# Patient Record
Sex: FEMALE | Race: OTHER | HISPANIC OR LATINO | URBAN - METROPOLITAN AREA
[De-identification: names, ages, dates, MRNs, and addresses within clinical notes are randomized per-mention and may not be internally consistent; named-entity substitution may affect disease eponyms.]

---

## 2023-07-18 ENCOUNTER — EMERGENCY (EMERGENCY)
Facility: HOSPITAL | Age: 43
LOS: 0 days | Discharge: HOME | End: 2023-07-18
Attending: STUDENT IN AN ORGANIZED HEALTH CARE EDUCATION/TRAINING PROGRAM
Payer: MEDICAID

## 2023-07-18 VITALS — HEIGHT: 66.93 IN | WEIGHT: 192.02 LBS

## 2023-07-18 VITALS
RESPIRATION RATE: 19 BRPM | OXYGEN SATURATION: 97 % | SYSTOLIC BLOOD PRESSURE: 132 MMHG | DIASTOLIC BLOOD PRESSURE: 61 MMHG | HEART RATE: 78 BPM | TEMPERATURE: 98 F

## 2023-07-18 DIAGNOSIS — R06.02 SHORTNESS OF BREATH: ICD-10-CM

## 2023-07-18 DIAGNOSIS — R10.9 UNSPECIFIED ABDOMINAL PAIN: ICD-10-CM

## 2023-07-18 DIAGNOSIS — R07.9 CHEST PAIN, UNSPECIFIED: ICD-10-CM

## 2023-07-18 DIAGNOSIS — H10.9 UNSPECIFIED CONJUNCTIVITIS: ICD-10-CM

## 2023-07-18 DIAGNOSIS — B34.9 VIRAL INFECTION, UNSPECIFIED: ICD-10-CM

## 2023-07-18 PROCEDURE — 93005 ELECTROCARDIOGRAM TRACING: CPT

## 2023-07-18 PROCEDURE — 99284 EMERGENCY DEPT VISIT MOD MDM: CPT

## 2023-07-18 PROCEDURE — 93010 ELECTROCARDIOGRAM REPORT: CPT

## 2023-07-18 PROCEDURE — 99283 EMERGENCY DEPT VISIT LOW MDM: CPT | Mod: 25

## 2023-07-18 RX ORDER — POLYMYXIN B SULF/TRIMETHOPRIM 10000-1/ML
1 DROPS OPHTHALMIC (EYE)
Qty: 1 | Refills: 0
Start: 2023-07-18 | End: 2023-07-22

## 2023-07-18 NOTE — ED STATDOCS - ATTENDING APP SHARED VISIT CONTRIBUTION OF CARE
I, Brain Jacobo, DO personally saw the patient with NARCISA.  I have personally performed a face to face diagnostic evaluation on this patient.  I have reviewed the NARCISA note and agree with the history, exam, and plan of care, except as noted.  I personally saw the patient and performed a substantive portion of the visit including all aspects of the medical decision making.

## 2023-07-18 NOTE — ED STATDOCS - PATIENT PORTAL LINK FT
You can access the FollowMyHealth Patient Portal offered by [facility] by registering at the following website: [email or web address]. By joining [employer]’s FollowMyHealth portal, you will also be able to view your health information using other applications (apps) compatible with our system.

## 2023-07-18 NOTE — ED STATDOCS - PROGRESS NOTE DETAILS
pt aware to use Polytrim as directed and fu with pmd, pt well appearing on discharge and agrees with plan. -Libby Pollack PA-C

## 2023-07-18 NOTE — ED ADULT TRIAGE NOTE - CHIEF COMPLAINT QUOTE
pt presents to Ed with complaints of chest pain, epigastric abdominal pain, subjective fever, and nausea x 3 days. EKG in progress.

## 2023-07-18 NOTE — ED STATDOCS - OBJECTIVE STATEMENT
: [id number]  43 y/o female w/ no pertinent PMHx presents to the ED c/o chest pain, abd pain, SOB, fevers, facial congestion, nausea x3 days, no vomiting, no diarrhea. Pt also endorses sore throat and right eye redness and right eye discharge. Denies sick contacts at home. States she has not seen her PCP for her symptoms. Pt's pharmacy is [facility] on [address]

## 2023-07-18 NOTE — ED ADULT NURSE REASSESSMENT NOTE - NS ED NURSE REASSESS COMMENT FT1
Patient seen evaluated treated and discharged by Dr. Jacobo and MARLON Martinez. No nursing interventions occurred. Pt left ED with all belongings.

## 2023-07-18 NOTE — ED STATDOCS - CLINICAL SUMMARY MEDICAL DECISION MAKING FREE TEXT BOX
41 y/o female w/ a viral syndrome and conjunctivitis of the right eye. Will provide eye drops to cover for bacterial conjunctivitis and d/c

## 2023-07-18 NOTE — ED ADULT TRIAGE NOTE - WEIGHT METHOD
This patient was referred for audiometric/tympanometric testing by JINA James due to sudden hearing loss, right ear. Pure tone air conduction audiometry revealed a borderline normal-to-mild   hearing loss in the left ear, and a moderate-to-severe rising to mild hearing loss in the right ear. Reliability was good. Results are consistent with previous test results. Tympanometry revealed normal middle ear peak pressure and compliance, in the left ear, and a wide gradient in the right ear with -128 daPa peak pressure. The results were reviewed with the patient. Hearing aids were discussed. Patient will call her insurance then call back if she wishes to schedule a hearing aid trial through [address] Gave patient a card with our contact information. Recommendations for follow up will be made pending physician consult.     Alex Perez AuD stated

## 2023-08-01 ENCOUNTER — EMERGENCY (EMERGENCY)
Facility: HOSPITAL | Age: 43
LOS: 0 days | Discharge: HOME | End: 2023-08-01
Attending: EMERGENCY MEDICINE
Payer: MEDICAID

## 2023-08-01 VITALS — HEIGHT: 66.93 IN | WEIGHT: 195.11 LBS

## 2023-08-01 VITALS
OXYGEN SATURATION: 99 % | TEMPERATURE: 98 F | DIASTOLIC BLOOD PRESSURE: 79 MMHG | RESPIRATION RATE: 20 BRPM | HEART RATE: 79 BPM | SYSTOLIC BLOOD PRESSURE: 124 MMHG

## 2023-08-01 DIAGNOSIS — M79.602 PAIN IN LEFT ARM: ICD-10-CM

## 2023-08-01 DIAGNOSIS — R23.8 OTHER SKIN CHANGES: ICD-10-CM

## 2023-08-01 DIAGNOSIS — L73.2 HIDRADENITIS SUPPURATIVA: ICD-10-CM

## 2023-08-01 DIAGNOSIS — L03.112 CELLULITIS OF LEFT AXILLA: ICD-10-CM

## 2023-08-01 PROCEDURE — 99283 EMERGENCY DEPT VISIT LOW MDM: CPT

## 2023-08-01 RX ORDER — CEPHALEXIN 500 MG
500 CAPSULE ORAL ONCE
Refills: 0 | Status: COMPLETED | OUTPATIENT
Start: 2023-08-01 | End: 2023-08-01

## 2023-08-01 RX ORDER — CEPHALEXIN 500 MG
1 CAPSULE ORAL
Qty: 40 | Refills: 0
Start: 2023-08-01 | End: 2023-08-10

## 2023-08-01 RX ORDER — ACETAMINOPHEN 500 MG
650 TABLET ORAL ONCE
Refills: 0 | Status: COMPLETED | OUTPATIENT
Start: 2023-08-01 | End: 2023-08-01

## 2023-08-01 RX ADMIN — Medication 650 MILLIGRAM(S): at 20:48

## 2023-08-01 RX ADMIN — Medication 1 TABLET(S): at 20:48

## 2023-08-01 RX ADMIN — Medication 500 MILLIGRAM(S): at 20:48

## 2023-08-01 NOTE — ED ADULT TRIAGE NOTE - CHIEF COMPLAINT QUOTE
Pt came into the ED with c/o see chief complaint quote. Pt reports about 2 weeks ago she "developed small lumps on her left breast and then the lumps became bigger and the breast is more red/swollen appearing". Pt also reports she had a fever (unknown temp) about 2 weeks ago when the lumps first appeared. Pt denies any PMH or allergies. ID #[id number].

## 2023-08-01 NOTE — ED STATDOCS - CLINICAL SUMMARY MEDICAL DECISION MAKING FREE TEXT BOX
No drainable collection.  D/c home with Bactrim, Keflex.  Also recommend warm compresses.  F/u with dermatology.  Strict return precautions given.

## 2023-08-01 NOTE — ED STATDOCS - NSFOLLOWUPINSTRUCTIONS_ED_ALL_ED_FT
Hidradenitis supurativa  Hidradenitis Suppurativa  Cross-sectional view of the skin showing a normal pore, a blocked pore, and an infected pore.   La hidradenitis supurativa es tasha enfermedad a gregg plazo (crónica) de la piel. Es similar a tasha forma grave de acné, wilder puede afectar zonas del cuerpo donde el acné sería poco frecuente, especialmente en zonas del cuerpo donde hay roce de piel y humedad. Estos incluyen:  Las axilas.  La mackenzie.  La karthik genital.  Las nalgas.  La parte superior de los muslos.  Las mamas.  La hidradenitis supurativa puede comenzar ryan pequeños bultos o granos causados por la obstrucción de los poros de la piel, las glándulas sudoríparas o los folículos pilosos. Los granos pueden convertirse en llagas profundas que se abren (rompen) y supuran pus. Con el tiempo, las zonas afectadas de la piel puede engrosarse y formar tejido cicatricial. Esta es tasha afección poco frecuente y no se transmite de tasha persona a otra (no es contagiosa).    ¿Cuáles son las causas?  Se desconoce la causa exacta de esta afección. Puede guardar relación con lo siguiente:  Hormonas masculinas y femeninas.  Un sistema que combate las enfermedades (sistema inmunitario) hiperactivo. El sistema inmunitario puede reaccionar exageradamente a los folículos pilosos o las glándulas sudoríparas obstruidas y provocar hinchazón y llagas con pus.  ¿Qué incrementa el riesgo?  Es más probable que tenga esta afección si:  Es quintin.  Tiene entre 11 y 55 años de edad.  Tiene antecedentes familiares de hidradenitis supurativa.  Tiene antecedentes personales de acné.  Tiene sobrepeso.  Fuma.  Mindy medicamentos con litio.  ¿Cuáles son los signos o síntomas?  Los primeros síntomas generalmente son protuberancias dolorosas en la piel, similares a los granos. La afección puede empeorar con el tiempo (avanzar) o puede solo causar síntomas leves. Si la afección avanza, los síntomas pueden incluir lo siguiente:  Protuberancias cutáneas que se agrandan y se adentran más en la piel.  Protuberancias que se rompen y drenan pus.  Picazón e infección en la piel.  Piel que se engrosa y forma tejido cicatricial.  Túneles debajo de la piel (fístulas) donde pus drena de tasha protuberancia.  Dolor al realizar las actividades cotidianas, ryan, por ejemplo, dolor al caminar si la karthik de la mackenzie está afectada.  Problemas emocionales ryan estrés o depresión. Esta afección puede afectar thompson aspecto y capacidad o voluntad de usar cierta ropa o realizar ciertas actividades.  ¿Cómo se diagnostica?  Un médico que se especializa en enfermedades de la piel (dermatólogo) diagnostica esta afección. Pueden diagnosticarle esta afección en función de lo siguiente:  Los síntomas y los antecedentes médicos.  Un examen físico.  Análisis de tasha muestra de pus para detectar tasha infección.  Análisis de hal.  ¿Cómo se trata?  El tratamiento dependerá de la gravedad de los síntomas. El mismo tratamiento no funcionará para todas las personas afectadas por esta afección. Ulices vez debe probar varios tratamientos para determinar lo que es más adecuado para thompson alphonse. El tratamiento puede incluir:  Limpiar y cubrir con vendajes (vendas) las heridas, según sea necesario.  Realizar cambios en el estilo de katerine, ryan nuevas rutinas para el cuidado de la piel.  Malgorzata medicamentos ryan, por ejemplo:  Antibióticos.  Medicamentos para el acné.  Medicamentos que reducen la actividad del sistema inmunitario.  Un medicamento para la diabetes (metformina).  Píldoras anticonceptivas, para las mujeres.  Corticoesteroides para reducir la hinchazón y el dolor.  Consultar a un médico especialista en barbie mental, si sufre estrés emocional debido a esta afección.  Si tiene síntomas graves que no mejoran con los medicamentos, puede necesitar cirugía. La cirugía consiste en lo siguiente:  Usar un jason láser para limpiar la piel y eliminar los folículos pilosos.  Abrir y drenar las llagas profundas.  Eliminar las zonas de piel enfermas y con cicatrices.  Siga estas instrucciones en thompson casa:  Medicamentos    A prescription pill bottle with an example of a pill.  Use los medicamentos de venta dakota y los recetados solamente ryan se lo haya indicado el médico.  Si le recetaron antibióticos, tómelos ryan se lo haya indicado el médico. No deje de usar el antibiótico aunque la afección mejore.  Cuidado de la piel    Si tiene heridas abiertas, cúbralas con un vendaje limpio ryan se lo haya indicado el médico. Mantenga las lesiones limpias lavándolas suavemente con agua y jabón al bañarse.  No rasure las zonas donde tiene hidradenitis supurativa.  Use ropa suelta.  Evite acalorarse o sudar demasiado. Si suda o se moja, póngase ropa limpia y seca lo antes posible.  Para ayudar a aliviar el dolor y la picazón, cubra las zonas de las llagas con un paño tibio y limpio (compresa tibia) connor 5 a 10 minutos, según sea necesario.  El médico puede recomendarle un desodorante antitranspirante que no irrite la piel.  El médico puede sugerirle un lavado antiséptico diario para limpiar las zonas afectadas.  Instrucciones generales    Aprenda todo lo que pueda acerca de thompson enfermedad para que tenga tasha participación activa en thompson tratamiento. Trabaje en estrecha colaboración con el médico a fin de hallar tratamientos que rc eficaces para usted.  Si tiene sobrepeso, colabore con el médico para adelgazar según las recomendaciones.  No consuma ningún producto que contenga nicotina o tabaco. Estos productos incluyen cigarrillos, tabaco para mascar y aparatos de vapeo, ryan los cigarrillos electrónicos. Si necesita ayuda para dejar de fumar, consulte al médico.  Si tiene dificultades para vivir con esta afección, hable con thompson médico o consulte a un especialista en barbie mental, según las recomendaciones.  Concurra a todas las visitas de seguimiento.  Dónde obtener más información  Hidradenitis Suppurativa Foundation, Inc.: www.hs-foundation.org  American Academy [employer] Dermatology ([employer]ia Estadoun[employer]at[employer]ía): www.aad.org  Comuníquese con un médico si:  Tiene tasha crisis de hidradenitis supurativa.  Tiene fiebre o escalofríos.  Tiene dificultad para controlar los síntomas en thompson casa.  Tiene dificultad para realizar tom actividades cotidianas debido a los síntomas.  Tiene dificultad para lidiar con los problemas emocionales relacionados con la afección.  Resumen  La hidradenitis supurativa es tasha enfermedad a gregg plazo (crónica) de la piel. Es similar a tasha forma grave de acné, wilder afecta zonas del cuerpo donde el acné sería poco frecuente.  Los primeros síntomas generalmente son protuberancias dolorosas en la piel, similares a los granos. La afección puede solo causar síntomas leves o puede empeorar con el tiempo (avanzar).  Si tiene heridas abiertas, cúbralas con un vendaje limpio ryan se lo haya indicado el médico. Mantenga las lesiones limpias lavándolas suavemente con agua y jabón al bañarse.  Además del cuidado de la piel, el tratamiento puede incluir medicamentos, tratamiento con láser y cirugía.  Esta información no tiene ryan fin reemplazar el consejo del médico. Asegúrese de hacerle al médico cualquier pregunta que tenga.    Document Revised: 03/01/2023 Document Reviewed: 03/01/2023

## 2023-08-01 NOTE — ED STATDOCS - OBJECTIVE STATEMENT
43 yo F no significant PMHx presents with CC of left axillary infection.  States symptoms noted over the last few days.  C/o pain, redness under left axilla, sores with occasional drainage, pus.  Denies fever, chills, or any other symptoms.  No meds taken at home.

## 2023-08-01 NOTE — ED STATDOCS - PROGRESS NOTE DETAILS
patient seen and evaluated with ED attending at intake.  +cellulitis to L axilla, no fluctuance, reviewed care at home, follow up and return precautions -ALEXANDRE BonnerC

## 2023-08-01 NOTE — ED ADULT NURSE NOTE - CHIEF COMPLAINT QUOTE
Pt came into the ED with c/o see chief complaint quote. Pt reports about 2 weeks ago she "developed small lumps on her left breast and then the lumps became bigger and the breast is more red/swollen appearing". Pt also reports she had a fever (unknown temp) about 2 weeks ago when the lumps first appeared. Pt denies any PMH or allergies. ID #[id number].

## 2023-08-01 NOTE — ED STATDOCS - CARE PLAN
1 Principal Discharge DX:	Cellulitis of axillary region  Secondary Diagnosis:	Hidradenitis axillaris

## 2023-08-01 NOTE — ED ADULT NURSE NOTE - OBJECTIVE STATEMENT
Pt presents to the ED c/o fever and rash on breasts. Pt states rash is red and itchy. NO other complaints at this time.

## 2023-08-01 NOTE — ED STATDOCS - NS ED ATTENDING STATEMENT MOD
Attending Only This was a shared visit with the NARCISA. I reviewed and verified the documentation and independently performed the documented:

## 2023-08-01 NOTE — ED ADULT NURSE NOTE - NS ED NURSE RECORD ANOTHER HT AND WT
Attempted to contact the patient by phone; there was no voicemail.    Patient due for CPE with Lorena MAGANA.    Health Maintenance Due   Topic Date Due   • Colorectal Cancer Screening-Colonoscopy  11/28/2019   • Shingles Vaccine (1 of 2) 11/28/2019   • Depression Screening  01/29/2020          Yes

## 2023-08-01 NOTE — ED STATDOCS - PATIENT PORTAL LINK FT
You can access the FollowMyHealth Patient Portal offered by [facility] by registering at the following website: [email or web address]. By joining [employer]’s FollowMyHealth portal, you will also be able to view your health information using other applications (apps) compatible with our system.

## 2023-08-01 NOTE — ED STATDOCS - CARE PROVIDER_API CALL
Jose Valentine  Dermatology  [address] [place] [address]  Phone: [phone]  Fax: [phone]  Follow Up Time:

## 2023-11-28 PROBLEM — Z00.00 ENCOUNTER FOR PREVENTIVE HEALTH EXAMINATION: Status: ACTIVE | Noted: 2023-11-28

## 2023-11-29 ENCOUNTER — APPOINTMENT (OUTPATIENT)
Dept: DERMATOLOGY | Facility: CLINIC | Age: 43
End: 2023-11-29
Payer: MEDICAID

## 2023-11-29 DIAGNOSIS — L81.1 CHLOASMA: ICD-10-CM

## 2023-11-29 DIAGNOSIS — L91.0 HYPERTROPHIC SCAR: ICD-10-CM

## 2023-11-29 DIAGNOSIS — L81.0 POSTINFLAMMATORY HYPERPIGMENTATION: ICD-10-CM

## 2023-11-29 PROCEDURE — 11900 INJECT SKIN LESIONS </W 7: CPT

## 2023-11-29 PROCEDURE — 99203 OFFICE O/P NEW LOW 30 MIN: CPT | Mod: 25

## 2023-11-29 RX ORDER — HYDROQUINONE 40 MG/G
4 CREAM TOPICAL
Qty: 1 | Refills: 1 | Status: ACTIVE | COMMUNITY
Start: 2023-11-29 | End: 1900-01-01

## 2024-01-03 ENCOUNTER — APPOINTMENT (OUTPATIENT)
Dept: DERMATOLOGY | Facility: CLINIC | Age: 44
End: 2024-01-03

## 2024-01-25 NOTE — ED ADULT NURSE NOTE - BEFAST LAST WELL KNOWN
Detail Level: Detailed Depth Of Biopsy: dermis Was A Bandage Applied: Yes Size Of Lesion In Cm: 0.5 X Size Of Lesion In Cm: 0 Biopsy Type: H and E Biopsy Method: Dermablade Anesthesia Type: 2% lidocaine without epinephrine Hemostasis: Aluminum Chloride and Electrocautery Wound Care: Aquaphor Dressing: Band-Aid Destruction After The Procedure: No Type Of Destruction Used: Curettage Curettage Text: The wound bed was treated with curettage after the biopsy was performed. Cryotherapy Text: The wound bed was treated with cryotherapy after the biopsy was performed. Electrodesiccation Text: The wound bed was treated with electrodesiccation after the biopsy was performed. Unknown Electrodesiccation And Curettage Text: The wound bed was treated with electrodesiccation and curettage after the biopsy was performed. Silver Nitrate Text: The wound bed was treated with silver nitrate after the biopsy was performed. Lab: -[id number] Consent: Verbal consent was obtained and risks were reviewed including but not limited to scarring, infection, bleeding, scabbing, incomplete removal, nerve damage and allergy to anesthesia. Post-Care Instructions: I reviewed with the patient in detail post-care instructions. Patient is to keep the biopsy site dry overnight, and then apply bacitracin twice daily until healed. Patient may apply hydrogen peroxide soaks to remove any crusting. Notification Instructions: Patient will be notified of biopsy results. However, patient instructed to call the office if not contacted within 2 weeks. Billing Type: Third-Party Bill Information: Selecting Yes will display possible errors in your note based on the variables you have selected. This validation is only offered as a suggestion for you. PLEASE NOTE THAT THE VALIDATION TEXT WILL BE REMOVED WHEN YOU FINALIZE YOUR NOTE. IF YOU WANT TO FAX A PRELIMINARY NOTE YOU WILL NEED TO TOGGLE THIS TO 'NO' IF YOU DO NOT WANT IT IN YOUR FAXED NOTE.

## 2025-03-03 ENCOUNTER — APPOINTMENT (OUTPATIENT)
Dept: BREAST CENTER | Facility: CLINIC | Age: 45
End: 2025-03-03

## 2025-03-03 VITALS — DIASTOLIC BLOOD PRESSURE: 69 MMHG | SYSTOLIC BLOOD PRESSURE: 116 MMHG | HEART RATE: 65 BPM

## 2025-03-03 DIAGNOSIS — L91.0 HYPERTROPHIC SCAR: ICD-10-CM

## 2025-03-03 PROCEDURE — 99203 OFFICE O/P NEW LOW 30 MIN: CPT

## 2025-03-03 RX ORDER — MUPIROCIN 2 G/100G
2 CREAM TOPICAL TWICE DAILY
Qty: 1 | Refills: 0 | Status: ACTIVE | COMMUNITY
Start: 2025-03-03 | End: 1900-01-01

## 2025-03-17 ENCOUNTER — APPOINTMENT (OUTPATIENT)
Dept: GASTROENTEROLOGY | Facility: CLINIC | Age: 45
End: 2025-03-17
Payer: COMMERCIAL

## 2025-03-17 VITALS
WEIGHT: 200 LBS | HEIGHT: 65 IN | SYSTOLIC BLOOD PRESSURE: 122 MMHG | DIASTOLIC BLOOD PRESSURE: 78 MMHG | BODY MASS INDEX: 33.32 KG/M2

## 2025-03-17 DIAGNOSIS — R19.4 CHANGE IN BOWEL HABIT: ICD-10-CM

## 2025-03-17 DIAGNOSIS — R10.13 EPIGASTRIC PAIN: ICD-10-CM

## 2025-03-17 PROCEDURE — 99203 OFFICE O/P NEW LOW 30 MIN: CPT

## 2025-04-16 ENCOUNTER — APPOINTMENT (OUTPATIENT)
Dept: BREAST CENTER | Facility: CLINIC | Age: 45
End: 2025-04-16

## 2025-04-16 DIAGNOSIS — N64.4 MASTODYNIA: ICD-10-CM

## 2025-04-16 PROCEDURE — 99213 OFFICE O/P EST LOW 20 MIN: CPT

## 2025-04-27 PROBLEM — N64.4 BREAST PAIN: Status: ACTIVE | Noted: 2025-04-27
